# Patient Record
Sex: MALE | Race: WHITE | HISPANIC OR LATINO | Employment: FULL TIME | URBAN - METROPOLITAN AREA
[De-identification: names, ages, dates, MRNs, and addresses within clinical notes are randomized per-mention and may not be internally consistent; named-entity substitution may affect disease eponyms.]

---

## 2017-01-05 ENCOUNTER — HOSPITAL ENCOUNTER (EMERGENCY)
Facility: HOSPITAL | Age: 42
Discharge: HOME | End: 2017-01-05
Attending: EMERGENCY MEDICINE

## 2017-01-05 VITALS
HEART RATE: 89 BPM | OXYGEN SATURATION: 98 % | TEMPERATURE: 98 F | RESPIRATION RATE: 16 BRPM | BODY MASS INDEX: 21.97 KG/M2 | SYSTOLIC BLOOD PRESSURE: 121 MMHG | DIASTOLIC BLOOD PRESSURE: 79 MMHG | HEIGHT: 67 IN | WEIGHT: 140 LBS

## 2017-01-05 DIAGNOSIS — S05.02XA CORNEAL ABRASION, LEFT, INITIAL ENCOUNTER: Primary | ICD-10-CM

## 2017-01-05 PROCEDURE — 99283 EMERGENCY DEPT VISIT LOW MDM: CPT

## 2017-01-05 PROCEDURE — 25000003 PHARM REV CODE 250: Performed by: EMERGENCY MEDICINE

## 2017-01-05 RX ORDER — PROPARACAINE HYDROCHLORIDE 5 MG/ML
1 SOLUTION/ DROPS OPHTHALMIC
Status: COMPLETED | OUTPATIENT
Start: 2017-01-05 | End: 2017-01-05

## 2017-01-05 RX ORDER — ERYTHROMYCIN 5 MG/G
OINTMENT OPHTHALMIC EVERY 6 HOURS
Qty: 1 TUBE | Refills: 0 | Status: SHIPPED | OUTPATIENT
Start: 2017-01-05

## 2017-01-05 RX ORDER — HYDROCODONE BITARTRATE AND ACETAMINOPHEN 5; 325 MG/1; MG/1
1 TABLET ORAL EVERY 4 HOURS PRN
Qty: 15 TABLET | Refills: 0 | Status: SHIPPED | OUTPATIENT
Start: 2017-01-05

## 2017-01-05 RX ADMIN — FLUORESCEIN SODIUM 1 STRIP: 1 STRIP OPHTHALMIC at 09:01

## 2017-01-05 RX ADMIN — PROPARACAINE HYDROCHLORIDE 1 DROP: 5 SOLUTION/ DROPS OPHTHALMIC at 09:01

## 2017-01-05 NOTE — ED AVS SNAPSHOT
[facility] [address] [place] [address]               Isac Leal   2017  9:10 PM   ED    Description:  Male : 1975   Department:  [facility]           Your Care was Coordinated By:     Provider Role From To    Turner Nolasco MD Attending Provider [phone] --      Reason for Visit     Eye Pain           Diagnoses this Visit        Comments    Corneal abrasion, left, initial encounter    -  Primary       ED Disposition     None           To Do List           Follow-up Information     Follow up with [facility]. Call in 2 days.    Specialty:  Family Medicine    Contact information:    [address] [place] [address] [phone]       These Medications        Disp Refills Start End    erythromycin (ROMYCIN) ophthalmic ointment 1 Tube 0 2017     Place into the left eye every 6 (six) hours. Place a 1/2 inch ribbon of ointment into the lower eyelid. - Left Eye    white petrolatum-mineral oil 56.8-42.5% (LACRI-LUBE S.O.P.) 56.8-42.5 % Oint 3.5 g 0 2017     Place into the left eye every evening. - Left Eye    hydrocodone-acetaminophen 5-325mg (NORCO) 5-325 mg per tablet 15 tablet 0 2017     Take 1 tablet by mouth every 4 (four) hours as needed for Pain. - Oral      [facility] On [facility]     [facility] On Call Nurse Care Line -  Assistance  Registered nurses in the [facility] On Call Center provide clinical advisement, health education, appointment booking, and other advisory services.  Call for this free service at [phone].             Medications           Message regarding Medications     Verify the changes and/or additions to your medication regime listed below are the same as discussed with your clinician today.  If any of these changes or additions are incorrect, please notify your healthcare provider.        START taking these NEW medications        Refills    erythromycin (ROMYCIN)  "ophthalmic ointment 0    Sig: Place into the left eye every 6 (six) hours. Place a 1/2 inch ribbon of ointment into the lower eyelid.    Class: Print    Route: Left Eye    white petrolatum-mineral oil 56.8-42.5% (LACRI-LUBE S.O.P.) 56.8-42.5 % Oint 0    Sig: Place into the left eye every evening.    Class: Print    Route: Left Eye    hydrocodone-acetaminophen 5-325mg (NORCO) 5-325 mg per tablet 0    Sig: Take 1 tablet by mouth every 4 (four) hours as needed for Pain.    Class: Print    Route: Oral      These medications were administered today        Dose Freq    proparacaine 0.5 % ophthalmic solution 1 drop 1 drop ED 1 Time    Sig: Place 1 drop into the left eye ED 1 Time.    Class: Normal    Route: Left Eye    fluorescein ophthalmic strip 1 strip 1 strip Once    Sig: Place 1 strip into the left eye once.    Class: Normal    Route: Left Eye           Verify that the below list of medications is an accurate representation of the medications you are currently taking.  If none reported, the list may be blank. If incorrect, please contact your healthcare provider. Carry this list with you in case of emergency.           Current Medications     erythromycin (ROMYCIN) ophthalmic ointment Place into the left eye every 6 (six) hours. Place a 1/2 inch ribbon of ointment into the lower eyelid.    hydrocodone-acetaminophen 5-325mg (NORCO) 5-325 mg per tablet Take 1 tablet by mouth every 4 (four) hours as needed for Pain.    white petrolatum-mineral oil 56.8-42.5% (LACRI-LUBE S.O.P.) 56.8-42.5 % Oint Place into the left eye every evening.           Clinical Reference Information           Your Vitals Were     BP Pulse Temp Resp Height Weight    121/79 (BP Location: Right arm, Patient Position: Sitting) 89 97.9 °F (36.6 °C) (Oral) 16 5' 7" (1.702 m) 63.5 kg (140 lb)    SpO2 BMI             98% 21.93 kg/m2         Allergies as of 1/5/2017     No Known Allergies      Immunizations Administered on Date of Encounter - 1/5/2017     " None      ED Micro, Lab, POCT     None      ED Imaging Orders     None        Discharge Instructions         Corneal Abrasion    You have received a scratch or scrape (abrasion) to your cornea. The cornea is the clear part in the front of the eye. This sensitive area is very painful when injured. You may make tears frequently, and your vision may be blurry until the injury heals. You may be sensitive to light.  This part of the body heals quickly. You can expect the pain to go away within 24 to 48 hours. If the abrasion is large or deep, your doctor may apply an eye patch, although this is not always done. An antibiotic ointment or eye drops may also be used to prevent infection.  Numbing drops may be used to relieve the pain temporarily so that your eyes can be examined. However, these drops cannot be prescribed for home use because that would prevent healing and lead to more serious problems. Also, if you cant feel your eye, there is a chance of accidentally injuring it further without knowing it.  Home care  · A cold pack (ice in a plastic bag, wrapped in a towel) may be applied over the eye (or eye patch) for 20 minutes at a time, to reduce pain.  · You may use acetaminophen or ibuprofen to control pain, unless another pain medicine was prescribed. Note: If you have chronic liver or kidney disease or ever had a stomach ulcer or GI bleeding, talk with your doctor before using these medicines.  · Rest your eyes and do not read until symptoms are gone.  · If you use contact lenses, do not wear them until all symptoms are gone.  · If your vision is affected by the corneal abrasion or if an eye patch was applied, do not drive a motor vehicle or operate machinery until all symptoms are gone. You may have trouble judging distances using only one eye.  · If your eyes are sensitive to light, try wearing sunglasses, or stay indoors until symptoms go away.  Follow-up care  Follow up with your health care provider, or as  advised.  · If no patch was put on your eye, and used but the pain continues for more than 48 hours, you should have another exam. Return to this facility or contact your health care provider to arrange this.  · If your eye was patched and you were asked to remove the patch yourself, see your health care provider. You may also return to this facility if you still have pain after the patch is removed.  · If you were given a return appointment for patch removal and re-examination, be sure to keep the appointment. Leaving the patch in place longer than advised could be harmful.  When to seek medical advice  Call your health care provider right away if any of these occur.  · Increasing eye pain or pain that does not improve after 24 hours  · Discharge from the eye  · Increasing redness of the eye or swelling of the eyelids  · Worsening vision  · Symptoms that worsen after the abrasion has healed  © 4234-0084 [employer]. [address] [place] [address]. All rights reserved. This information is not intended as a substitute for professional medical care. Always follow your healthcare professional's instructions.          [facility] Sign-Up     Activating your [facility] account is as easy as 1-2-3!     1) Visit [email or web address], select Sign Up Now, enter this activation code and your date of birth, then select Next.  [email or web address]  Expires: 2/19/2017 10:11 PM      2) Create a username and password to use when you visit [facility] in the future and select a security question in case you lose your password and select Next.    3) Enter your e-mail address and click Sign Up!    Additional Information  If you have questions, please e-mail [email or web address] or call [phone] to talk to our [facility] staff. Remember, [facility] is NOT to be used for urgent needs. For medical emergencies, dial 911.         Smoking Cessation     If you would like to quit smoking:   You may be eligible for free  services if you are a [place] resident and started smoking cigarettes before September 1, 1988.  Call the Smoking Cessation Trust (SCT) toll free at (134) 032-4127 or (301) 004-4969.   Call 7-426-QUIT-NOW if you do not meet the above criteria.             [facility] complies with applicable Federal civil rights laws and does not discriminate on the basis of race, color, national origin, age, disability, or sex.        Language Assistance Services     ATTENTION: Language assistance services are available, free of charge. Please call [phone].      ATENCIÓN: Si habla español, tiene a agarwal disposición servicios gratuitos de asistencia lingüística. Llame al [phone].     CHÚ Ý: N?u b?n nói Ti?ng Vi?t, có các d?ch v? h? tr? ngôn ng? mi?n phí dành cho b?n. G?i s? [phone].

## 2017-01-06 NOTE — ED NOTES
PTA pt was cutting lumber and thinks that a piece flew into his left eye, he feels that there is something in his eye, family member assisted him to flush his eye out 3 or 4 times. Alert calm denies change in vision. Family member at bedside.

## 2017-01-06 NOTE — DISCHARGE INSTRUCTIONS
Corneal Abrasion    You have received a scratch or scrape (abrasion) to your cornea. The cornea is the clear part in the front of the eye. This sensitive area is very painful when injured. You may make tears frequently, and your vision may be blurry until the injury heals. You may be sensitive to light.  This part of the body heals quickly. You can expect the pain to go away within 24 to 48 hours. If the abrasion is large or deep, your doctor may apply an eye patch, although this is not always done. An antibiotic ointment or eye drops may also be used to prevent infection.  Numbing drops may be used to relieve the pain temporarily so that your eyes can be examined. However, these drops cannot be prescribed for home use because that would prevent healing and lead to more serious problems. Also, if you cant feel your eye, there is a chance of accidentally injuring it further without knowing it.  Home care  · A cold pack (ice in a plastic bag, wrapped in a towel) may be applied over the eye (or eye patch) for 20 minutes at a time, to reduce pain.  · You may use acetaminophen or ibuprofen to control pain, unless another pain medicine was prescribed. Note: If you have chronic liver or kidney disease or ever had a stomach ulcer or GI bleeding, talk with your doctor before using these medicines.  · Rest your eyes and do not read until symptoms are gone.  · If you use contact lenses, do not wear them until all symptoms are gone.  · If your vision is affected by the corneal abrasion or if an eye patch was applied, do not drive a motor vehicle or operate machinery until all symptoms are gone. You may have trouble judging distances using only one eye.  · If your eyes are sensitive to light, try wearing sunglasses, or stay indoors until symptoms go away.  Follow-up care  Follow up with your health care provider, or as advised.  · If no patch was put on your eye, and used but the pain continues for more than 48 hours, you  should have another exam. Return to this facility or contact your health care provider to arrange this.  · If your eye was patched and you were asked to remove the patch yourself, see your health care provider. You may also return to this facility if you still have pain after the patch is removed.  · If you were given a return appointment for patch removal and re-examination, be sure to keep the appointment. Leaving the patch in place longer than advised could be harmful.  When to seek medical advice  Call your health care provider right away if any of these occur.  · Increasing eye pain or pain that does not improve after 24 hours  · Discharge from the eye  · Increasing redness of the eye or swelling of the eyelids  · Worsening vision  · Symptoms that worsen after the abrasion has healed  © 1478-0215 [employer]. [address] [place] [address], [place] [address]. All rights reserved. This information is not intended as a substitute for professional medical care. Always follow your healthcare professional's instructions.

## 2017-01-06 NOTE — ED NOTES
Given written and verbal DC instructions questions answered per MD aware to follow up with PCP encouraged to return if needed. Given RX with teaching per LPN

## 2017-01-06 NOTE — ED PROVIDER NOTES
Encounter Date: 1/5/2017    SCRIBE #1 NOTE: I, Viv Weaver, am scribing for, and in the presence of,  Dr. Nolasco. I have scribed the entire note.       History     Chief Complaint   Patient presents with    Eye Pain     cutting plywood today and possible debris in left eye     Review of patient's allergies indicates:  No Known Allergies  HPI Comments: 01/05/2017  9:56 PM     Chief Complaint: eye injury       The patient is a 41 y.o. male who is presenting with acute onset of left eye pain after he was cutting wood chips today. He reports significant amount of pain in his left eye with clear discharge. Pain is severe and does not radiate. There are no alleviating factors reported. He has no associated sx's. He has no past medical history on file. He has no past surgical history on file.    The history is provided by the spouse and the patient.     History reviewed. No pertinent past medical history.  No past medical history pertinent negatives.  History reviewed. No pertinent past surgical history.  No family history on file.  Social History   Substance Use Topics    Smoking status: Current Every Day Smoker     Packs/day: 0.50     Types: Cigarettes    Smokeless tobacco: None    Alcohol use Yes      Comment: occ     Review of Systems   Constitutional: Negative for fever.   HENT: Negative for sore throat.    Eyes: Positive for pain, discharge and redness. Negative for visual disturbance.   Respiratory: Negative for shortness of breath.    Cardiovascular: Negative for chest pain.   Gastrointestinal: Negative for nausea.   Genitourinary: Negative for dysuria.   Musculoskeletal: Negative for back pain.   Skin: Negative for rash.   Neurological: Negative for weakness.   Hematological: Does not bruise/bleed easily.   Psychiatric/Behavioral: Negative for confusion.       Physical Exam   Initial Vitals   BP Pulse Resp Temp SpO2   01/05/17 2101 01/05/17 2101 01/05/17 2101 01/05/17 2101 01/05/17 2101   121/79 89 16 97.9  °F (36.6 °C) 98 %     Physical Exam    Nursing note and vitals reviewed.  Constitutional: He appears well-developed and well-nourished.   HENT:   Head: Normocephalic.   Eyes:   Fluorescein uptake on the superior nasal aspect of the cornea at the 9-11 o'clock position,  but not over the pupillary area.    Also uptake toward the medial canthus      Cardiovascular: Normal rate, regular rhythm, normal heart sounds and intact distal pulses. Exam reveals no gallop and no friction rub.    No murmur heard.  Pulmonary/Chest: Breath sounds normal. No respiratory distress. He has no wheezes. He has no rhonchi. He has no rales. He exhibits no tenderness.   Abdominal: Soft. Bowel sounds are normal.   Neurological: He is alert and oriented to person, place, and time.   Skin: Skin is warm.   Psychiatric: He has a normal mood and affect.         ED Course   Procedures  Labs Reviewed - No data to display                     Scribe Attestation:   Scribe #1: I performed the above scribed service and the documentation accurately describes the services I performed. I attest to the accuracy of the note.    Attending Attestation:           Physician Attestation for Scribe:  Physician Attestation Statement for Scribe #1: I, Dr. Nolasco, reviewed documentation, as scribed by Viv Weaver in my presence, and it is both accurate and complete.         Attending ED Notes:   This is a 41-year-old male presents with left eye pain after chipping wood.  My overall impression is corneal abrasion based on my exam.  Patient will be placed on antibiotic ointment, Rx for pain control.  Advised for medication compliance to prevent corneal ulceration and I scarring/vision loss.  Follow-up with referred physician or return to ED for any concerns.  Patient expressed understanding.          ED Course     Clinical Impression:   The encounter diagnosis was Corneal abrasion, left, initial encounter.          Turner Nolasco MD  01/12/17 2375